# Patient Record
Sex: MALE | Race: OTHER
[De-identification: names, ages, dates, MRNs, and addresses within clinical notes are randomized per-mention and may not be internally consistent; named-entity substitution may affect disease eponyms.]

---
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
## 2020-08-16 ENCOUNTER — HOSPITAL ENCOUNTER (EMERGENCY)
Dept: HOSPITAL 54 - ER | Age: 20
LOS: 1 days | Discharge: HOME | End: 2020-08-17
Payer: MEDICAID

## 2020-08-16 VITALS — HEIGHT: 72 IN | WEIGHT: 156 LBS | BODY MASS INDEX: 21.13 KG/M2

## 2020-08-16 DIAGNOSIS — J45.909: ICD-10-CM
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
## 2020-08-16 DIAGNOSIS — S61.253A: Primary | ICD-10-CM

## 2020-08-16 PROCEDURE — 73140 X-RAY EXAM OF FINGER(S): CPT

## 2020-08-16 PROCEDURE — 99283 EMERGENCY DEPT VISIT LOW MDM: CPT

## 2020-08-16 PROCEDURE — 90471 IMMUNIZATION ADMIN: CPT

## 2020-08-16 PROCEDURE — 90715 TDAP VACCINE 7 YRS/> IM: CPT

## 2020-08-17 VITALS — DIASTOLIC BLOOD PRESSURE: 77 MMHG | SYSTOLIC BLOOD PRESSURE: 135 MMHG

## 2020-08-17 NOTE — NUR
pt is medically stable for d/c. good laceration care provided by dr garza to 
manage the bleeding. Patient discharged to home in stable condition. Rx and 
Written and verbal after care instructions given. Patient verbalizes 
understanding of instruction. pt was instructed to see Dr. jay HAN.

## 2020-08-18 ENCOUNTER — HOSPITAL ENCOUNTER (EMERGENCY)
Dept: HOSPITAL 54 - ER | Age: 20
Discharge: HOME | End: 2020-08-18
Payer: MEDICAID

## 2020-08-18 VITALS — SYSTOLIC BLOOD PRESSURE: 147 MMHG | DIASTOLIC BLOOD PRESSURE: 87 MMHG

## 2020-08-18 VITALS — WEIGHT: 156 LBS | BODY MASS INDEX: 21.13 KG/M2 | HEIGHT: 72 IN

## 2020-08-18 DIAGNOSIS — S61.213D: Primary | ICD-10-CM
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
## 2020-08-18 DIAGNOSIS — J45.909: ICD-10-CM

## 2024-01-21 NOTE — NUR
DR. NEISHA GARCIA PER DENISHA MATT ORDER. Procedure: 36721 - Trigger finger release  right ring finger  Tentative Date:  TBD   Duration of Procedure: 20 min  Hospital: Any  Anesthesia: Local  Length of Stay: Day Surgery  Antibiotics: None  Equipment:  67 or 69 [place] blade  Littler scissor  Pre-Op Done By: Surgeon  Consent: To be signed at hospital  -------------------  Hospital Orders: 1% Lidocaine with Epi 1:100,000    [id number]: m65.341    Post-op appt: Frandy Prasad MD

## 2024-09-24 NOTE — NUR
EMERGENCY DEPARTMENT ENCOUNTER    Pt Name: Jose Alejandro Young  MRN: [id number]  Pt :   1959  Room Number:  26SF/26  Date of encounter:  2024  PCP: Henrry Hernandez MD  ED Provider: DORY Barrera    Historian: Patient    HPI:  Chief Complaint:  Lt thumb laceration.     Context: Jose Alejandro Young is a 65 y.o. male who presents to the ED c/o Lt thumb laceration.  Pt was using a biscuit cutter this afternoon when he lacerated the base of his Lt thumb. Pt has a v-shaped laceration below the Lt thumb.  Patient has full range of motion of the digit.  He denies any numbness, tingling.  Patient's tetanus is up-to-date.  Patient had his tetanus updated in July.  Wound approximates.  HPI     REVIEW OF SYSTEMS  A chief complaint appropriate review of systems was completed and is negative except as noted in the HPI.     PAST MEDICAL HISTORY  No past medical history on file.    PAST SURGICAL HISTORY  No past surgical history on file.    FAMILY HISTORY  No family history on file.    SOCIAL HISTORY  Social History     Socioeconomic History    Marital status:        ALLERGIES  Patient has no known allergies.    PHYSICAL EXAM  Physical Exam  Vitals and nursing note reviewed.   Constitutional:       General: He is not in acute distress.     Appearance: Normal appearance. He is not ill-appearing.   HENT:      Head: Normocephalic and atraumatic.      Nose: Nose normal.      Mouth/Throat:      Mouth: Mucous membranes are moist.   Eyes:      Extraocular Movements: Extraocular movements intact.      Conjunctiva/sclera: Conjunctivae normal.   Cardiovascular:      Rate and Rhythm: Normal rate and regular rhythm.      Pulses: Normal pulses.      Heart sounds: Normal heart sounds.   Pulmonary:      Effort: Pulmonary effort is normal. No respiratory distress.      Breath sounds: Normal breath sounds.   Musculoskeletal:        Hands:       Cervical back: Normal range of motion.   Skin:     General: Skin is warm and dry.  DR. LANDA PAGED FOR CONSULT.   Neurological:      General: No focal deficit present.      Mental Status: He is alert and oriented to person, place, and time.   Psychiatric:         Mood and Affect: Mood normal.         Behavior: Behavior normal.           LAB RESULTS  No results found for this or any previous visit.    If labs were ordered, I independently reviewed the results and considered them in treating the patient.    RADIOLOGY  XR Hand 3+ View Left   Final Result   Impression:   No acute fracture or traumatic malalignment.      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         Electronically Signed: Moody Fernandez MD     9/24/2024 3:50 PM EDT     Workstation ID: [id number]        [x] Radiologist's Report Reviewed:  I ordered and independently interpreted the above noted radiographic studies.  See radiologist's dictation for official interpretation.      PROCEDURES    Laceration Repair    Date/Time: 9/24/2024 4:50 PM    Performed by: Vianney Stratton APRN  Authorized by: Moody Holm MD    Consent:     Consent obtained:  Verbal    Consent given by:  Patient    Risks, benefits, and alternatives were discussed: yes      Risks discussed:  Infection, poor cosmetic result and poor wound healing    Alternatives discussed:  No treatment  Universal protocol:     Procedure explained and questions answered to patient or proxy's satisfaction: yes      Patient identity confirmed:  Verbally with patient  Anesthesia:     Anesthesia method:  Local infiltration    Local anesthetic:  Lidocaine 1% w/o epi  Laceration details:     Location:  Hand    Hand location:  L hand, dorsum    Wound length (cm): 3.  Pre-procedure details:     Preparation:  Patient was prepped and draped in usual sterile fashion  Exploration:     Limited defect created (wound extended): yes      Hemostasis achieved with:  Direct pressure    Imaging obtained: x-ray      Imaging outcome: foreign body not noted      Wound exploration: wound explored through full range  of motion      Contaminated: no    Treatment:     Area cleansed with:  Povidone-iodine and saline    Amount of cleaning:  Standard    Irrigation solution:  Sterile saline    Irrigation method:  Syringe    Debridement:  None  Skin repair:     Repair method:  Sutures    Suture size:  4-0    Suture material:  Prolene    Suture technique:  Simple interrupted    Number of sutures:  6  Approximation:     Approximation:  Close  Repair type:     Repair type:  Simple  Post-procedure details:     Dressing:  Splint for protection and bulky dressing    Procedure completion:  Tolerated      No orders to display       MEDICATIONS GIVEN IN ER    Medications   lidocaine PF 1% (XYLOCAINE) injection 10 mL (10 mL Infiltration Given by Other 9/24/24 8778)       MEDICAL DECISION MAKING, PROGRESS, and CONSULTS   Medical Decision Making  Jose Alejandro Young is a 65 y.o. male who presents to the ED c/o Lt thumb laceration.  Pt was using a biscuit cutter this afternoon when he lacerated the base of his Lt thumb. Pt has a v-shaped laceration below the Lt thumb.  Patient has full range of motion of the digit.  He denies any numbness, tingling.  Patient's tetanus is up-to-date.  Patient had his tetanus updated in July.  Wound approximates.      Problems Addressed:  Laceration of left thumb without foreign body without damage to nail, initial encounter: complicated acute illness or injury     Details: Patient has a 3 cm laceration at the base of the left thumb.  Wound was repaired.  We will splint the digit.  Patient to watch for signs of infection.  Patient to take antibiotic as ordered.    Amount and/or Complexity of Data Reviewed  Radiology: ordered. Decision-making details documented in ED Course.    Risk  Prescription drug management.        Discussion below represents my analysis of pertinent findings related to patient's condition, differential diagnosis, treatment plan and final disposition.    Differential diagnosis:  The differential  diagnosis associated with the patient's presentation includes: Finger laceration, fracture,  Additional sources  Discussed/ obtained information from independent historians:   [x] Spouse  [] Parent  [] Family member  [] Friend  [] EMS   [] Other:  External (non-ED) record review:   [] Inpatient record:   [] Office record:   [] Outpatient record:   [x] Prior Outpatient labs:   [x] Prior Outpatient radiology:   [] Primary Care record:   [] Outside ED record:   [] Other:   Patient's care impacted by:   [x] Diabetes  [] Hypertension  [] Hyperlipidemia  [] Hypothyroidism   [] Coronary Artery Disease   [] COPD   [] Cancer   [] Obesity  [] GERD   [] Tobacco Abuse   [] Substance Abuse    [] Anxiety   [] Depression   [] Other:   Care significantly affected by Social Determinants of Health (housing and economic circumstances, unemployment)    [] Yes     [x] No   If yes, Patient's care significantly limited by  Social Determinants of Health including:   [] Inadequate housing   [] Low income   [] Alcoholism and drug addiction in family   [] Problems related to primary support group   [] Unemployment   [] Problems related to employment   [] Other Social Determinants of Health:   Shared decision making: Shared decision making with patient we discussed treatment plan.  Wound was repaired.  Patient tolerated well.  We discussed signs and symptoms of infection.  We will discharge patient home on oral antibiotics.  Patient to watch for signs of infection.  Patient to have stitches removed in 10 to 14 days.  Patient to wear splint for comfort.    Orders placed during this visit:  Orders Placed This Encounter   Procedures    Laceration Repair    XR Hand 3+ View Left    Supplies To Bedside - Notify MD When Ready- Suture Tray / Cart; Sterile Gloves: 6.5; Suture Required: Prolene; Size: 4.0       I considered prescription management  with:   [] Pain medication  [] Antiviral  [] Antibiotic   [] Other:   Rationale:  Additional orders  considered but not ordered:  The following testing was considered but ultimately not selected after discussion with patient/family:    ED Course:    ED Course as of 09/24/24 1721   Tue Sep 24, 2024   1604 IMPRESSION:  Impression:  No acute fracture or traumatic malalignment.     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      [KG]   1655 XR Hand 3+ View Left  I personally reviewed the 3 views of the left hand.  On my interpretation there is no displaced fracture or dislocation.  Degenerative changes noted. [RS]      ED Course User Index  [KG] Vianney Stratton, APRN  [RS] Moody Holm MD            DIAGNOSIS  Final diagnoses:   Laceration of left thumb without foreign body without damage to nail, initial encounter       DISPOSITION    DISCHARGE    Patient discharged in stable condition.    Reviewed implications of results, diagnosis, meds, responsibility to follow up, warning signs and symptoms of possible worsening, potential complications and reasons to return to ER.    Patient/Family voiced understanding of above instructions.    Discussed plan for discharge, as there is no emergent indication for admission.  Pt/family is agreeable and understands need for follow up and possible repeat testing.  Pt/family is aware that discharge does not mean that nothing is wrong but that it indicates no emergency is currently present that requires admission and they must continue care with follow-up as given below or with a physician of their choice.     FOLLOW-UP  Beiting, Henrry QUINTERO MD  [address] [facility] [address]  [place] [id number]  [phone]          Yair Oseguera MD  [address] [facility] [address]  [place] [id number]  [phone]               Medication List        New Prescriptions      cephalexin 500 MG capsule  Commonly known as: KEFLEX  Take 1 capsule by mouth 3 (Three) Times a Day for 7 days.               Where to Get Your Medications        These medications were sent to [id number] - [place], [place] - [address] - [phone]  - [phone] FX  [address], [facility] [address]      Phone: [phone]   cephalexin 500 MG capsule          ED Disposition       ED Disposition   Discharge    Condition   Stable    Comment   --               Please note that portions of this document were completed with voice recognition software.       Vianney Stratton, APRN  09/24/24 1975

## 2025-03-19 ENCOUNTER — HOSPITAL ENCOUNTER (EMERGENCY)
Dept: HOSPITAL 54 - ER | Age: 25
LOS: 1 days | Discharge: HOME | End: 2025-03-20
Payer: MEDICAID

## 2025-03-19 VITALS — WEIGHT: 185 LBS | BODY MASS INDEX: 25.06 KG/M2 | HEIGHT: 72 IN

## 2025-03-19 DIAGNOSIS — R42: ICD-10-CM

## 2025-03-19 DIAGNOSIS — F41.9: ICD-10-CM

## 2025-03-19 DIAGNOSIS — Z88.7: ICD-10-CM

## 2025-03-19 DIAGNOSIS — J45.909: ICD-10-CM

## 2025-03-19 DIAGNOSIS — R00.2: Primary | ICD-10-CM

## 2025-03-19 RX ADMIN — LORAZEPAM ONE MG: 1 TABLET ORAL at 23:30

## 2025-03-20 VITALS — TEMPERATURE: 98.1 F | OXYGEN SATURATION: 100 % | SYSTOLIC BLOOD PRESSURE: 129 MMHG | DIASTOLIC BLOOD PRESSURE: 82 MMHG
